# Patient Record
Sex: FEMALE | Race: WHITE | NOT HISPANIC OR LATINO | Employment: UNEMPLOYED | URBAN - METROPOLITAN AREA
[De-identification: names, ages, dates, MRNs, and addresses within clinical notes are randomized per-mention and may not be internally consistent; named-entity substitution may affect disease eponyms.]

---
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
## 2025-03-03 ENCOUNTER — OFFICE VISIT (OUTPATIENT)
Dept: URGENT CARE | Facility: URGENT CARE | Age: 20
End: 2025-03-03
Payer: COMMERCIAL

## 2025-03-03 VITALS
RESPIRATION RATE: 19 BRPM | OXYGEN SATURATION: 100 % | SYSTOLIC BLOOD PRESSURE: 125 MMHG | DIASTOLIC BLOOD PRESSURE: 67 MMHG | HEART RATE: 80 BPM | BODY MASS INDEX: 24.38 KG/M2 | WEIGHT: 130.1 LBS | TEMPERATURE: 98.7 F

## 2025-03-03 DIAGNOSIS — K11.20 SIALOADENITIS OF SUBMANDIBULAR GLAND: ICD-10-CM

## 2025-03-03 DIAGNOSIS — M54.2 NECK PAIN: Primary | ICD-10-CM

## 2025-03-03 LAB
DEPRECATED S PYO AG THROAT QL EIA: NEGATIVE
FLUAV AG SPEC QL IA: NEGATIVE
FLUBV AG SPEC QL IA: NEGATIVE
S PYO DNA THROAT QL NAA+PROBE: NOT DETECTED

## 2025-03-03 PROCEDURE — 87804 INFLUENZA ASSAY W/OPTIC: CPT | Performed by: PHYSICIAN ASSISTANT

## 2025-03-03 PROCEDURE — 3078F DIAST BP <80 MM HG: CPT | Performed by: PHYSICIAN ASSISTANT

## 2025-03-03 PROCEDURE — 1125F AMNT PAIN NOTED PAIN PRSNT: CPT | Performed by: PHYSICIAN ASSISTANT

## 2025-03-03 PROCEDURE — 87651 STREP A DNA AMP PROBE: CPT | Performed by: PHYSICIAN ASSISTANT

## 2025-03-03 PROCEDURE — 3074F SYST BP LT 130 MM HG: CPT | Performed by: PHYSICIAN ASSISTANT

## 2025-03-03 PROCEDURE — 99204 OFFICE O/P NEW MOD 45 MIN: CPT | Performed by: PHYSICIAN ASSISTANT

## 2025-03-03 RX ORDER — CEPHALEXIN 500 MG/1
1000 CAPSULE ORAL 2 TIMES DAILY
Qty: 40 CAPSULE | Refills: 0 | Status: SHIPPED | OUTPATIENT
Start: 2025-03-03 | End: 2025-03-13

## 2025-03-03 ASSESSMENT — PAIN SCALES - GENERAL: PAINLEVEL_OUTOF10: SEVERE PAIN (8)

## 2025-03-03 NOTE — PATIENT INSTRUCTIONS
Covering with antibiotic incase this is bacterial related. But very important to do warm compresses, hard candy/lemon drops, fluid.       Push fluids, rest and ibuprofen or tylenol for comfort.      Follow-up with ENT for persistent or worsening symptoms.

## 2025-03-03 NOTE — PROGRESS NOTES
Assessment & Plan         Sialoadenitis of submandibular gland  Pt has swollen and tender submandibular gland and discomfort with palpable nodule which likely represents stone in the duct.  Discussed this could represent obstruction vs viral or bacterial infection. Treated with saliagogis, ibuprofen or tylenol for comfort, warm compresses and cover for bacterial infection with keflex.  Referral to ENT if not improving over the next week. Discussed CT or ultrasound may be needed for further evaluation and management if not improving as expected.    - cephALEXin (KEFLEX) 500 MG capsule  Dispense: 40 capsule; Refill: 0  - Adult ENT  Referral           [facility] [place] [facility]    Susy Roy is a 20 year old female who presents to clinic today for the following health issues:  Chief Complaint   Patient presents with    Mass     Lump on throat/jaw area. Patient reports that yesterday while she was chewing she had a bad pain under her tongue, right side, sharp nerve pain sensation. Today swelling in the neck under chin/ right side. Current pain ranked an 8. Hurts most when trying to open mouth wide. Patient's mother reports that when she was younger and would get Strep her neck glands would swell up. Patient also mentions last Wednesday she had a bad pressure headache near jaw and sinus areas which has since subsided.          3/3/2025    10:44 AM   Additional Questions   Roomed by Hilton Billings   Accompanied by Mother- Joyce     LYNDA    Swelling noted on the R jawline, increased with eating.    No fevers.    HA, jaw pain temple pain last week and resolved. M  Drinking ok,  ibuprofen last night not helpful.   No nausea/vomiting. No tobacco use   No known exposures.    No sore throat.    UTD on immunizations for MMR.    Has not had any travel out of [place] or close exposure to individuals who have  traveled.        Review of Systems  Constitutional, HEENT,  cardiovascular, pulmonary, gi and gu systems are negative, except as otherwise noted.      Objective    /67 (BP Location: Left arm, Patient Position: Sitting, Cuff Size: Adult Regular)   Pulse 80   Temp 98.7  F (37.1  C) (Oral)   Resp 19   Wt 59 kg (130 lb 1.6 oz)   LMP  (LMP Unknown)   SpO2 100%   BMI 24.38 kg/m    Physical Exam   Nad appears well  Exam of the neck reveals tender enlarged submandibular gland. No additional cervical adenopathy or masses noted.    Palpable tenderness along the course of the submandublar duct orally on the R in the floor of the mouth. 2 small palpable nodules present that are mildly tender.   Results for orders placed or performed in visit on 03/03/25   Streptococcus A Rapid Screen w/Reflex to PCR     Status: Normal    Specimen: Throat; Swab   Result Value Ref Range    Group A Strep antigen Negative Negative   Influenza A & B Antigen     Status: Normal    Specimen: Nose; Swab   Result Value Ref Range    Influenza A antigen Negative Negative    Influenza B antigen Negative Negative    Narrative    Test results must be correlated with clinical data. If necessary, results should be confirmed by a molecular assay or viral culture.   Group A Streptococcus PCR Throat Swab     Status: Normal    Specimen: Throat; Swab   Result Value Ref Range    Group A strep by PCR Not Detected Not Detected    Narrative    The Xpert Xpress Strep A test, performed on the [employer], is a rapid, qualitative in vitro diagnostic test for the detection of Streptococcus pyogenes (Group A ß-hemolytic Streptococcus, Strep A) in throat swab specimens from patients with signs and symptoms of pharyngitis. The Xpert Xpress Strep A test can be used as an aid in the diagnosis of Group A Streptococcal pharyngitis. The assay is not intended to monitor treatment for Group A Streptococcus infections. The Xpert Xpress Strep A test utilizes an automated real-time polymerase chain reaction (PCR)  to detect Streptococcus pyogenes DNA.

## 2025-03-15 ENCOUNTER — HEALTH MAINTENANCE LETTER (OUTPATIENT)
Age: 20
End: 2025-03-15